# Patient Record
Sex: FEMALE | Race: WHITE
[De-identification: names, ages, dates, MRNs, and addresses within clinical notes are randomized per-mention and may not be internally consistent; named-entity substitution may affect disease eponyms.]

---

## 2018-08-21 ENCOUNTER — HOSPITAL ENCOUNTER (OUTPATIENT)
Dept: HOSPITAL 45 - C.MAMM | Age: 63
Discharge: HOME | End: 2018-08-21
Attending: OBSTETRICS & GYNECOLOGY
Payer: COMMERCIAL

## 2018-08-21 DIAGNOSIS — Z12.31: Primary | ICD-10-CM

## 2018-08-22 NOTE — MAMMOGRAPHY REPORT
BILATERAL DIGITAL SCREENING MAMMOGRAM TOMOSYNTHESIS WITH CAD: 8/21/2018

CLINICAL HISTORY: Routine screening. Patient has no complaints.  





TECHNIQUE: The study was acquired using full field digital technology and interpreted from soft copy.
 Breast tomosynthesis in addition to standard 2D mammography was performed. Current study was also ev
aluated with a Computer Aided Detection (CAD) system.  



COMPARISON: Comparison is made to exams dated:  6/12/2017 mammogram, 6/6/2017 mammogram, 5/2/2016 [place]
[facility], 4/28/2015 mammogram, 3/17/2014 mammogram, and 3/14/2013 mammogram - [facility].   

BREAST COMPOSITION: There are scattered areas of fibroglandular density in both breasts.  



FINDINGS: There is decreased asymmetry along the posterior nipple line on the right CC view but stabl
e nodular asymmetry in the medial anterior right breast. No suspicious mass, architectural distortion
 or cluster of microcalcifications is seen.  



IMPRESSION: ACR BI-RADS CATEGORY 1: NEGATIVE

There is no mammographic evidence of malignancy. A 1 year screening mammogram is recommended.(08/22/2
019)  The patient will receive written notification of the results.  





Some breast cancers are not detected with mammography. A negative mammographic report should not alexis
y biopsy if a clinically suggestive mass is present.



Mague Jaffe M.D.          

ay/:8/21/2018 15:51:43  



Imaging Technologist: Margarita Mueller, [facility]

letter sent: Normal 1/2  

BI-RADS Code: ACR BI-RADS Category 1: Negative